# Patient Record
Sex: FEMALE | Race: WHITE | Employment: FULL TIME | URBAN - METROPOLITAN AREA
[De-identification: names, ages, dates, MRNs, and addresses within clinical notes are randomized per-mention and may not be internally consistent; named-entity substitution may affect disease eponyms.]

---

## 2022-11-07 ENCOUNTER — HOSPITAL ENCOUNTER (OUTPATIENT)
Dept: PREADMISSION TESTING | Age: 42
Discharge: HOME | End: 2022-11-07
Payer: COMMERCIAL

## 2022-11-07 VITALS
HEART RATE: 80 BPM | SYSTOLIC BLOOD PRESSURE: 128 MMHG | HEIGHT: 66 IN | WEIGHT: 293 LBS | RESPIRATION RATE: 16 BRPM | BODY MASS INDEX: 47.09 KG/M2 | DIASTOLIC BLOOD PRESSURE: 82 MMHG | TEMPERATURE: 97.1 F | OXYGEN SATURATION: 97 %

## 2022-11-07 LAB
ALBUMIN SERPL-MCNC: 3.2 G/DL (ref 3.5–5)
ALBUMIN/GLOB SERPL: 0.8 {RATIO} (ref 1.1–2.2)
ALP SERPL-CCNC: 70 U/L (ref 45–117)
ALT SERPL-CCNC: 56 U/L (ref 12–78)
ANION GAP SERPL CALC-SCNC: 7 MMOL/L (ref 5–15)
AST SERPL-CCNC: 25 U/L (ref 15–37)
ATRIAL RATE: 78 BPM
BASOPHILS # BLD: 0.1 K/UL (ref 0–0.1)
BASOPHILS NFR BLD: 1 % (ref 0–1)
BILIRUB SERPL-MCNC: 0.7 MG/DL (ref 0.2–1)
BUN SERPL-MCNC: 10 MG/DL (ref 6–20)
BUN/CREAT SERPL: 13 (ref 12–20)
CALCIUM SERPL-MCNC: 9 MG/DL (ref 8.5–10.1)
CALCULATED P AXIS, ECG09: 87 DEGREES
CALCULATED R AXIS, ECG10: -25 DEGREES
CALCULATED T AXIS, ECG11: 14 DEGREES
CHLORIDE SERPL-SCNC: 104 MMOL/L (ref 97–108)
CO2 SERPL-SCNC: 29 MMOL/L (ref 21–32)
CREAT SERPL-MCNC: 0.75 MG/DL (ref 0.55–1.02)
DIAGNOSIS, 93000: NORMAL
DIFFERENTIAL METHOD BLD: ABNORMAL
EOSINOPHIL # BLD: 0.4 K/UL (ref 0–0.4)
EOSINOPHIL NFR BLD: 5 % (ref 0–7)
ERYTHROCYTE [DISTWIDTH] IN BLOOD BY AUTOMATED COUNT: 14.5 % (ref 11.5–14.5)
GLOBULIN SER CALC-MCNC: 3.9 G/DL (ref 2–4)
GLUCOSE SERPL-MCNC: 136 MG/DL (ref 65–100)
HCG UR QL: NEGATIVE
HCT VFR BLD AUTO: 42 % (ref 35–47)
HGB BLD-MCNC: 12.9 G/DL (ref 11.5–16)
IMM GRANULOCYTES # BLD AUTO: 0 K/UL (ref 0–0.04)
IMM GRANULOCYTES NFR BLD AUTO: 0 % (ref 0–0.5)
LYMPHOCYTES # BLD: 2.1 K/UL (ref 0.8–3.5)
LYMPHOCYTES NFR BLD: 27 % (ref 12–49)
MCH RBC QN AUTO: 25.4 PG (ref 26–34)
MCHC RBC AUTO-ENTMCNC: 30.7 G/DL (ref 30–36.5)
MCV RBC AUTO: 82.8 FL (ref 80–99)
MONOCYTES # BLD: 0.6 K/UL (ref 0–1)
MONOCYTES NFR BLD: 7 % (ref 5–13)
NEUTS SEG # BLD: 4.7 K/UL (ref 1.8–8)
NEUTS SEG NFR BLD: 60 % (ref 32–75)
NRBC # BLD: 0 K/UL (ref 0–0.01)
NRBC BLD-RTO: 0 PER 100 WBC
P-R INTERVAL, ECG05: 198 MS
PLATELET # BLD AUTO: 292 K/UL (ref 150–400)
PMV BLD AUTO: 9.4 FL (ref 8.9–12.9)
POTASSIUM SERPL-SCNC: 4 MMOL/L (ref 3.5–5.1)
PROT SERPL-MCNC: 7.1 G/DL (ref 6.4–8.2)
Q-T INTERVAL, ECG07: 378 MS
QRS DURATION, ECG06: 114 MS
QTC CALCULATION (BEZET), ECG08: 430 MS
RBC # BLD AUTO: 5.07 M/UL (ref 3.8–5.2)
SODIUM SERPL-SCNC: 140 MMOL/L (ref 136–145)
VENTRICULAR RATE, ECG03: 78 BPM
WBC # BLD AUTO: 7.9 K/UL (ref 3.6–11)

## 2022-11-07 PROCEDURE — 86905 BLOOD TYPING RBC ANTIGENS: CPT

## 2022-11-07 PROCEDURE — 80053 COMPREHEN METABOLIC PANEL: CPT

## 2022-11-07 PROCEDURE — 86922 COMPATIBILITY TEST ANTIGLOB: CPT

## 2022-11-07 PROCEDURE — 86880 COOMBS TEST DIRECT: CPT

## 2022-11-07 PROCEDURE — 86921 COMPATIBILITY TEST INCUBATE: CPT

## 2022-11-07 PROCEDURE — 85025 COMPLETE CBC W/AUTO DIFF WBC: CPT

## 2022-11-07 PROCEDURE — 86900 BLOOD TYPING SEROLOGIC ABO: CPT

## 2022-11-07 PROCEDURE — 93005 ELECTROCARDIOGRAM TRACING: CPT

## 2022-11-07 PROCEDURE — 86920 COMPATIBILITY TEST SPIN: CPT

## 2022-11-07 PROCEDURE — 86870 RBC ANTIBODY IDENTIFICATION: CPT

## 2022-11-07 PROCEDURE — 86902 BLOOD TYPE ANTIGEN DONOR EA: CPT

## 2022-11-07 PROCEDURE — 36415 COLL VENOUS BLD VENIPUNCTURE: CPT

## 2022-11-07 PROCEDURE — 81025 URINE PREGNANCY TEST: CPT

## 2022-11-07 RX ORDER — LISINOPRIL AND HYDROCHLOROTHIAZIDE 12.5; 2 MG/1; MG/1
1 TABLET ORAL DAILY
COMMUNITY

## 2022-11-07 RX ORDER — MULTIVITAMIN WITH IRON
1 TABLET ORAL DAILY
COMMUNITY

## 2022-11-07 RX ORDER — ESCITALOPRAM OXALATE 10 MG/1
10 TABLET ORAL DAILY
COMMUNITY

## 2022-11-07 RX ORDER — ACETAMINOPHEN AND CODEINE PHOSPHATE 120; 12 MG/5ML; MG/5ML
1 SOLUTION ORAL DAILY
COMMUNITY
End: 2022-11-23

## 2022-11-07 NOTE — PERIOP NOTES
[address], [address] [facility]   MAIN OR                                  [phone]   MAIN PRE OP                          [phone]                                                                                AMBULATORY PRE OP          [phone]  PRE-ADMISSION TESTING    [phone]     Surgery Date:  11/22/2022 Tuesday       Is surgery arrival time given by surgeon? NO  If NO, [facility] staff will call you between 3 and 7pm the day before your surgery with your arrival time. (If your surgery is on a Monday, we will call you the Friday before.)    Call [phone] after 7pm Monday-Friday if you did not receive this call. INSTRUCTIONS BEFORE YOUR SURGERY   When You  Arrive Arrive at the [facility]nd [address] [facility] on the day of your surgery  Have your insurance card, photo ID, and any copayment (if needed)   Food   and   Drink NO food or drink after midnight the night before surgery    This means NO water, gum, mints, coffee, juice, etc.  No alcohol (beer, wine, liquor) 24 hours before and after surgery   Medications to   TAKE   Morning of Surgery MEDICATIONS TO TAKE THE MORNING OF SURGERY WITH A SIP OF WATER:   Escitalopram  Cetirizine    Continue all other prescriptions as normal just do not take them the morning of surgery. Medications  To  STOP      7 days before surgery Non-Steroidal anti-inflammatory Drugs (NSAID's): for example, Ibuprofen (Advil, Motrin), Naproxen (Aleve)  Aspirin, if taking for pain   Herbal supplements, vitamins, and fish oil  Other:Vitamin C and D, Turmeric, Multi  (Pain medications not listed above, including Tylenol may be taken)   Blood  Thinners If you take  Aspirin, Plavix, Coumadin, or any blood-thinning or anti-blood clot medicine, talk to the doctor who prescribed the medications for pre-operative instructions.    Bathing Clothing  Jewelry  Valuables     If you shower the morning of surgery, please do not apply anything to your skin (lotions, powders, deodorant, or makeup, especially mascara)  Follow Chlorhexidine Care Fusion body wash instructions provided to you during PAT appointment. Begin 3 days prior to surgery. Do not shave or trim anywhere 24 hours before surgery  Wear your hair loose or down; no pony-tails, buns, or metal hair clips  Wear loose, comfortable, clean clothes  Wear glasses instead of contacts  Leave money, valuables, and jewelry, including body piercings, at home  If you were given an Sepaton, bring it on day of surgery. Going Home - or Spending the Night SAME-DAY SURGERY: You must have a responsible adult drive you home and stay with you 24 hours after surgery  ADMITS: If your doctor is keeping you in the hospital after surgery, leave personal belongings/luggage in your car until you have a hospital room number. Hospital discharge time is 12 noon  Drivers must be here before 12 noon unless you are told differently   Special Instructions        Follow all instructions so your surgery wont be cancelled. Please, be on time. If a situation occurs and you are delayed the day of surgery, call [phone]. If your physical condition changes (like a fever, cold, flu, etc.) call your surgeon. Home medication(s) reviewed and verified verbally with list during PAT appointment. The patient was contacted in person. The patient verbalizes understanding of all instructions and does not need reinforcement.

## 2022-11-21 ENCOUNTER — ANESTHESIA EVENT (OUTPATIENT)
Dept: SURGERY | Age: 42
End: 2022-11-21
Payer: COMMERCIAL

## 2022-11-21 NOTE — H&P
History and Physical    Christen Reyna 12QP F    1980    #[id number]      Encounter Summary - H&P  Date Printed:   2022            Encounter Date 2022       Chief Complaint endometrial biopsy*, Pre-Op Exam       History of Present Illness Rm 7: 35yo  patient presents for pre-op and EMBx. Last saw [facility] 10/20/2022. Consent form given    Procedure: robotic assisted TLH/BS, removal of pelvic mass, possible oophorectomy scheduled for 22 with [facility] and Rosana Jean to assist.       Past Medical History Reviewed Past Medical History  Hypertension (high blood pressure): Y  Other: (no answer) - PTSD       Social History Reviewed Social History         Surgical History Reviewed Surgical History     delivery - 2014       Family History Reviewed Family History    Mother - Alzheimer's disease     - Hysterectomy  - at age 25; unsure why it was done   Father - Heart disease     - Malignant melanoma   Step father passed away in 2022. Medications Reviewed Medications     escitalopram 10 mg tablet  TAKE 1 TABLET BY MOUTH EVERY DAY 22   filled    lisinopriL 20 mg-hydrochlorothiazide 12.5 mg tablet  TAKE 1 TABLET BY MOUTH EVERY DAY 10/07/22   filled    multivitamin 22   entered    norethindrone (contraceptive) 0.35 mg tablet  TAKE 1 TABLET BY MOUTH EVERY DAY 10/18/22   filled    potassium 22   entered    Vitamin C 22   entered    Vitamin D 22   entered              Allergies Reviewed Allergies     PORK/PORCINE CONTAINING PRODUCTS    NKDA             Vitals Ht: 5 ft 6 in (167.64 cm) 2022 12:50 pm Wt: 355 lbs (161.03 kg) 2022 12:50 pm BMI: 57.3 2022 12:50 pm   BP: 126/86 2022 12:55 pm                 Review of Systems Additionally reports: Except as noted in the HPI, the review of systems is negative for General, Breast, , Resp, GI, CV, Endo, MS, Psych and Heme.        Physical Exam Chaperone: Chaperone: present. Constitutional: General Appearance: pleasant and morbidly obese. Level of Distress: no acute distress. Ambulation: ambulating normally. Head: Head: normocephalic. Eyes: Extraocular Movements extraocular movements intact. Ears, Nose, Mouth, Throat: Ears normal hearing. Lungs / Chest: Respiratory effort: unlabored. CTAB    Cardiovascular: Rate And Rhythm: regular. Abdomen: Inspection and Palpation: soft, non-distended, and no guarding. Female : External genitalia: no lesions, rash, erythema, or genital warts. Vagina: no discharge, mass, or tenderness and moist mucosa and non-erythematous mucosa. Cervix: no discharge, inflammation, or cervical lesion. Uterus: midline, smooth, and no mass; no descent. Adnexae: no adnexal mass or tenderness. Extremities: Extremities: no swelling or inflammation of extremities. Musculoskeletal System: General Musculoskeletal no joint, muscle, or bone tenderness. Skin: General Skin no rash or suspicious lesions. Neurologic: Gait and Station: normal gait. Sensation: grossly intact. Motor: grossly intact. Mental Status Exam: Orientation oriented to person, place, and time. Assessment and Plan 1. Cyst of ovary -  2022 with 8cm pelvic mass, simple iwth one septation. 2022 8cm LOV simple cyst, no complexity   normal  No pain    Will proceed with laparoscopic removal of pelvic mass, suspected to be ovary, Karla Espinoza assisted  N83.209: Unspecified ovarian cyst, unspecified side    2. Abnormal uterine bleeding -  41yo with morbid obesity and HTN with menorrhagia, desiring management. Heavy periods, dysmenorrhea x 3 years  10 days    Hgb 13  TSH normal  FSH 5.8  Pap NILM 2021    Curretnly on norethindrone and unhappy w/ weight gain.   estrogen CI    [place]- 2 years ago embx normal.    PSHx-  section  PMHx: HTN  Exam: Obese, no descent of the uterus however palpates mobile    Embx- benign    On norethindrone- declines IUD  POPs- weight gain    -->laparoscopic removal of pelvic mass, possible oophorectomy  Possible hysteroscopy, D&C, mirena placement VERSUS hysterectomy    Reviewed risks/benefits of hysterectomy. Reviewed the challenges with her weight and potential conversion to abdominal hysterectomy requiring hospital admission and increased risk of postoperative complications. I reviewed risks of bleeding infection, injury to surrounding tissue. Recovery and pelvic rest 6-8 weeks. Potential same day discharge. Surgery: TLH/BS Davinci assisted, removal of pelvic mass possible oophorectomy    Reviewed same day discharge vs 23h observation  Reviewed potential for conversion to the abdominal hysterectomy.   606/[id number] Jennifer Chan consents signed today    N93.9: Abnormal uterine and vaginal bleeding, unspecified  PREGNANCY TEST, URINE -      Specimen source: Urine  ANATOMIC PATHOLOGY REPORT -      Specimen source: Endometrium  Source: endometrial biopsy     PREGNANCY TEST, URINE  ·  Result:    - HCG: negative         Provider   Electronically Signed by: Madison Becerra MD

## 2022-11-22 ENCOUNTER — HOSPITAL ENCOUNTER (OUTPATIENT)
Age: 42
Setting detail: OBSERVATION
Discharge: HOME | End: 2022-11-23
Attending: OBSTETRICS & GYNECOLOGY | Admitting: OBSTETRICS & GYNECOLOGY
Payer: COMMERCIAL

## 2022-11-22 ENCOUNTER — ANESTHESIA (OUTPATIENT)
Dept: SURGERY | Age: 42
End: 2022-11-22
Payer: COMMERCIAL

## 2022-11-22 DIAGNOSIS — Z90.710 S/P LAPAROSCOPIC HYSTERECTOMY: Primary | ICD-10-CM

## 2022-11-22 LAB
ABO + RH BLD: NORMAL
ANTI-COMPLEMENT (C3B,C3D): NORMAL
ANTIGENS PRESENT BLD: NORMAL
ANTIGENS PRESENT RBC DONR: NORMAL
ANTIGENS PRESENT RBC DONR: NORMAL
BLD PROD TYP BPU: NORMAL
BLD PROD TYP BPU: NORMAL
BLOOD GROUP ANTIBODIES SERPL: NORMAL
BLOOD GROUP ANTIBODIES SERPL: NORMAL
BPU ID: NORMAL
BPU ID: NORMAL
CROSSMATCH RESULT,%XM: NORMAL
CROSSMATCH RESULT,%XM: NORMAL
DAT IGG-SP REAG RBC QL: NORMAL
DAT POLY-SP REAG RBC QL: NORMAL
HCG UR QL: NEGATIVE
SPECIMEN EXP DATE BLD: NORMAL
STATUS OF UNIT,%ST: NORMAL
STATUS OF UNIT,%ST: NORMAL
UNIT DIVISION, %UDIV: 0
UNIT DIVISION, %UDIV: 0

## 2022-11-22 PROCEDURE — 77030040361 HC SLV COMPR DVT MDII -B

## 2022-11-22 PROCEDURE — 76060000038 HC ANESTHESIA 3.5 TO 4 HR: Performed by: OBSTETRICS & GYNECOLOGY

## 2022-11-22 PROCEDURE — 77030008756 HC TU IRR SUC STRY -B: Performed by: OBSTETRICS & GYNECOLOGY

## 2022-11-22 PROCEDURE — 74011000250 HC RX REV CODE- 250: Performed by: NURSE ANESTHETIST, CERTIFIED REGISTERED

## 2022-11-22 PROCEDURE — 77030008684 HC TU ET CUF COVD -B: Performed by: ANESTHESIOLOGY

## 2022-11-22 PROCEDURE — 77030035277 HC OBTRTR BLDELSS DISP INTU -B: Performed by: OBSTETRICS & GYNECOLOGY

## 2022-11-22 PROCEDURE — 77030016154: Performed by: OBSTETRICS & GYNECOLOGY

## 2022-11-22 PROCEDURE — 88307 TISSUE EXAM BY PATHOLOGIST: CPT

## 2022-11-22 PROCEDURE — 74011250636 HC RX REV CODE- 250/636: Performed by: NURSE ANESTHETIST, CERTIFIED REGISTERED

## 2022-11-22 PROCEDURE — 76010000879 HC OR TIME 3.5 TO 4HR INTENSV - TIER 2: Performed by: OBSTETRICS & GYNECOLOGY

## 2022-11-22 PROCEDURE — G0378 HOSPITAL OBSERVATION PER HR: HCPCS

## 2022-11-22 PROCEDURE — 77030008771 HC TU NG SALEM SUMP -A: Performed by: ANESTHESIOLOGY

## 2022-11-22 PROCEDURE — 86920 COMPATIBILITY TEST SPIN: CPT

## 2022-11-22 PROCEDURE — 86880 COOMBS TEST DIRECT: CPT

## 2022-11-22 PROCEDURE — 77030040922 HC BLNKT HYPOTHRM STRY -A

## 2022-11-22 PROCEDURE — 77030018778 HC MANIP UTER VCAR CNMD -B: Performed by: OBSTETRICS & GYNECOLOGY

## 2022-11-22 PROCEDURE — 86921 COMPATIBILITY TEST INCUBATE: CPT

## 2022-11-22 PROCEDURE — 77030041236 HC APPL SURG ENDO JNJ -B: Performed by: OBSTETRICS & GYNECOLOGY

## 2022-11-22 PROCEDURE — 86905 BLOOD TYPING RBC ANTIGENS: CPT

## 2022-11-22 PROCEDURE — 77030031139 HC SUT VCRL2 J&J -A: Performed by: OBSTETRICS & GYNECOLOGY

## 2022-11-22 PROCEDURE — 86922 COMPATIBILITY TEST ANTIGLOB: CPT

## 2022-11-22 PROCEDURE — 2709999900 HC NON-CHARGEABLE SUPPLY: Performed by: OBSTETRICS & GYNECOLOGY

## 2022-11-22 PROCEDURE — 77030037834: Performed by: OBSTETRICS & GYNECOLOGY

## 2022-11-22 PROCEDURE — 77030022704 HC SUT VLOC COVD -B: Performed by: OBSTETRICS & GYNECOLOGY

## 2022-11-22 PROCEDURE — 77030026438 HC STYL ET INTUB CARD -A: Performed by: ANESTHESIOLOGY

## 2022-11-22 PROCEDURE — 36415 COLL VENOUS BLD VENIPUNCTURE: CPT

## 2022-11-22 PROCEDURE — 77030019927 HC TBNG IRR CYSTO BAXT -A: Performed by: OBSTETRICS & GYNECOLOGY

## 2022-11-22 PROCEDURE — 86870 RBC ANTIBODY IDENTIFICATION: CPT

## 2022-11-22 PROCEDURE — 77030035029 HC NDL INSUF VERES DISP COVD -B: Performed by: OBSTETRICS & GYNECOLOGY

## 2022-11-22 PROCEDURE — 74011000250 HC RX REV CODE- 250: Performed by: OBSTETRICS & GYNECOLOGY

## 2022-11-22 PROCEDURE — 86900 BLOOD TYPING SEROLOGIC ABO: CPT

## 2022-11-22 PROCEDURE — 74011250636 HC RX REV CODE- 250/636: Performed by: OBSTETRICS & GYNECOLOGY

## 2022-11-22 PROCEDURE — 74011250636 HC RX REV CODE- 250/636: Performed by: ANESTHESIOLOGY

## 2022-11-22 PROCEDURE — 77030033162 HC PCH SPEC RTVR ENDOSC AMR -B: Performed by: OBSTETRICS & GYNECOLOGY

## 2022-11-22 PROCEDURE — 77030039147 HC PWDR HEMSTS SURGICEL JNJ -D: Performed by: OBSTETRICS & GYNECOLOGY

## 2022-11-22 PROCEDURE — 77030002933 HC SUT MCRYL J&J -A: Performed by: OBSTETRICS & GYNECOLOGY

## 2022-11-22 PROCEDURE — 77030020703 HC SEAL CANN DISP INTU -B: Performed by: OBSTETRICS & GYNECOLOGY

## 2022-11-22 PROCEDURE — 77030031492 HC PRT ACC BLNT AIRSEAL CNMD -B: Performed by: OBSTETRICS & GYNECOLOGY

## 2022-11-22 PROCEDURE — 81025 URINE PREGNANCY TEST: CPT

## 2022-11-22 PROCEDURE — 77030013079 HC BLNKT BAIR HGGR 3M -A: Performed by: ANESTHESIOLOGY

## 2022-11-22 PROCEDURE — 77030037032 HC INSRT SCIS CLICKLLINE DISP STOR -B: Performed by: OBSTETRICS & GYNECOLOGY

## 2022-11-22 PROCEDURE — 74011250637 HC RX REV CODE- 250/637: Performed by: OBSTETRICS & GYNECOLOGY

## 2022-11-22 PROCEDURE — 76210000016 HC OR PH I REC 1 TO 1.5 HR: Performed by: OBSTETRICS & GYNECOLOGY

## 2022-11-22 RX ORDER — BUPIVACAINE HYDROCHLORIDE AND EPINEPHRINE 5; 5 MG/ML; UG/ML
INJECTION, SOLUTION EPIDURAL; INTRACAUDAL; PERINEURAL AS NEEDED
Status: DISCONTINUED | OUTPATIENT
Start: 2022-11-22 | End: 2022-11-22 | Stop reason: HOSPADM

## 2022-11-22 RX ORDER — ONDANSETRON 2 MG/ML
INJECTION INTRAMUSCULAR; INTRAVENOUS AS NEEDED
Status: DISCONTINUED | OUTPATIENT
Start: 2022-11-22 | End: 2022-11-22 | Stop reason: HOSPADM

## 2022-11-22 RX ORDER — NALOXONE HYDROCHLORIDE 0.4 MG/ML
0.2 INJECTION, SOLUTION INTRAMUSCULAR; INTRAVENOUS; SUBCUTANEOUS
Status: DISCONTINUED | OUTPATIENT
Start: 2022-11-22 | End: 2022-11-22 | Stop reason: HOSPADM

## 2022-11-22 RX ORDER — HYDROMORPHONE HYDROCHLORIDE 1 MG/ML
.5-1 INJECTION, SOLUTION INTRAMUSCULAR; INTRAVENOUS; SUBCUTANEOUS
Status: DISCONTINUED | OUTPATIENT
Start: 2022-11-22 | End: 2022-11-22 | Stop reason: HOSPADM

## 2022-11-22 RX ORDER — METRONIDAZOLE 500 MG/100ML
500 INJECTION, SOLUTION INTRAVENOUS ONCE
Status: COMPLETED | OUTPATIENT
Start: 2022-11-22 | End: 2022-11-22

## 2022-11-22 RX ORDER — DEXAMETHASONE SODIUM PHOSPHATE 4 MG/ML
INJECTION, SOLUTION INTRA-ARTICULAR; INTRALESIONAL; INTRAMUSCULAR; INTRAVENOUS; SOFT TISSUE AS NEEDED
Status: DISCONTINUED | OUTPATIENT
Start: 2022-11-22 | End: 2022-11-22 | Stop reason: HOSPADM

## 2022-11-22 RX ORDER — SODIUM CHLORIDE 0.9 % (FLUSH) 0.9 %
5-40 SYRINGE (ML) INJECTION EVERY 8 HOURS
Status: DISCONTINUED | OUTPATIENT
Start: 2022-11-22 | End: 2022-11-22 | Stop reason: HOSPADM

## 2022-11-22 RX ORDER — OXYCODONE HYDROCHLORIDE 5 MG/1
5 TABLET ORAL
Status: DISCONTINUED | OUTPATIENT
Start: 2022-11-22 | End: 2022-11-23 | Stop reason: HOSPADM

## 2022-11-22 RX ORDER — OXYCODONE HYDROCHLORIDE 5 MG/1
10 TABLET ORAL
Status: DISCONTINUED | OUTPATIENT
Start: 2022-11-22 | End: 2022-11-23 | Stop reason: HOSPADM

## 2022-11-22 RX ORDER — EPHEDRINE SULFATE/0.9% NACL/PF 50 MG/5 ML
SYRINGE (ML) INTRAVENOUS AS NEEDED
Status: DISCONTINUED | OUTPATIENT
Start: 2022-11-22 | End: 2022-11-22 | Stop reason: HOSPADM

## 2022-11-22 RX ORDER — FENTANYL CITRATE 50 UG/ML
INJECTION, SOLUTION INTRAMUSCULAR; INTRAVENOUS AS NEEDED
Status: DISCONTINUED | OUTPATIENT
Start: 2022-11-22 | End: 2022-11-22 | Stop reason: HOSPADM

## 2022-11-22 RX ORDER — SODIUM CHLORIDE 0.9 % (FLUSH) 0.9 %
5-40 SYRINGE (ML) INJECTION AS NEEDED
Status: DISCONTINUED | OUTPATIENT
Start: 2022-11-22 | End: 2022-11-22 | Stop reason: HOSPADM

## 2022-11-22 RX ORDER — SODIUM CHLORIDE 0.9 % (FLUSH) 0.9 %
5-40 SYRINGE (ML) INJECTION EVERY 8 HOURS
Status: DISCONTINUED | OUTPATIENT
Start: 2022-11-22 | End: 2022-11-23 | Stop reason: HOSPADM

## 2022-11-22 RX ORDER — PHENYLEPHRINE HCL IN 0.9% NACL 0.4MG/10ML
SYRINGE (ML) INTRAVENOUS AS NEEDED
Status: DISCONTINUED | OUTPATIENT
Start: 2022-11-22 | End: 2022-11-22 | Stop reason: HOSPADM

## 2022-11-22 RX ORDER — KETAMINE HYDROCHLORIDE 10 MG/ML
INJECTION, SOLUTION INTRAMUSCULAR; INTRAVENOUS AS NEEDED
Status: DISCONTINUED | OUTPATIENT
Start: 2022-11-22 | End: 2022-11-22 | Stop reason: HOSPADM

## 2022-11-22 RX ORDER — ROCURONIUM BROMIDE 10 MG/ML
INJECTION, SOLUTION INTRAVENOUS AS NEEDED
Status: DISCONTINUED | OUTPATIENT
Start: 2022-11-22 | End: 2022-11-22 | Stop reason: HOSPADM

## 2022-11-22 RX ORDER — KETOROLAC TROMETHAMINE 30 MG/ML
30 INJECTION, SOLUTION INTRAMUSCULAR; INTRAVENOUS EVERY 6 HOURS
Status: DISCONTINUED | OUTPATIENT
Start: 2022-11-22 | End: 2022-11-23 | Stop reason: HOSPADM

## 2022-11-22 RX ORDER — MIDAZOLAM HYDROCHLORIDE 1 MG/ML
INJECTION, SOLUTION INTRAMUSCULAR; INTRAVENOUS AS NEEDED
Status: DISCONTINUED | OUTPATIENT
Start: 2022-11-22 | End: 2022-11-22 | Stop reason: HOSPADM

## 2022-11-22 RX ORDER — ACETAMINOPHEN 325 MG/1
975 TABLET ORAL EVERY 6 HOURS
Status: DISCONTINUED | OUTPATIENT
Start: 2022-11-22 | End: 2022-11-23 | Stop reason: HOSPADM

## 2022-11-22 RX ORDER — SODIUM CHLORIDE 9 MG/ML
75 INJECTION, SOLUTION INTRAVENOUS CONTINUOUS
Status: CANCELLED | OUTPATIENT
Start: 2022-11-22

## 2022-11-22 RX ORDER — LIDOCAINE HYDROCHLORIDE 20 MG/ML
INJECTION, SOLUTION EPIDURAL; INFILTRATION; INTRACAUDAL; PERINEURAL AS NEEDED
Status: DISCONTINUED | OUTPATIENT
Start: 2022-11-22 | End: 2022-11-22 | Stop reason: HOSPADM

## 2022-11-22 RX ORDER — HYDROMORPHONE HYDROCHLORIDE 1 MG/ML
1 INJECTION, SOLUTION INTRAMUSCULAR; INTRAVENOUS; SUBCUTANEOUS
Status: DISCONTINUED | OUTPATIENT
Start: 2022-11-22 | End: 2022-11-23 | Stop reason: HOSPADM

## 2022-11-22 RX ORDER — SODIUM CHLORIDE, SODIUM LACTATE, POTASSIUM CHLORIDE, CALCIUM CHLORIDE 600; 310; 30; 20 MG/100ML; MG/100ML; MG/100ML; MG/100ML
INJECTION, SOLUTION INTRAVENOUS
Status: DISCONTINUED | OUTPATIENT
Start: 2022-11-22 | End: 2022-11-22 | Stop reason: HOSPADM

## 2022-11-22 RX ORDER — LIDOCAINE HYDROCHLORIDE 10 MG/ML
0.1 INJECTION, SOLUTION EPIDURAL; INFILTRATION; INTRACAUDAL; PERINEURAL AS NEEDED
Status: DISCONTINUED | OUTPATIENT
Start: 2022-11-22 | End: 2022-11-22 | Stop reason: HOSPADM

## 2022-11-22 RX ORDER — OXYCODONE HYDROCHLORIDE 5 MG/1
5 TABLET ORAL
Qty: 12 TABLET | Refills: 0 | Status: SHIPPED | OUTPATIENT
Start: 2022-11-22 | End: 2022-11-25

## 2022-11-22 RX ORDER — PROPOFOL 10 MG/ML
INJECTION, EMULSION INTRAVENOUS AS NEEDED
Status: DISCONTINUED | OUTPATIENT
Start: 2022-11-22 | End: 2022-11-22 | Stop reason: HOSPADM

## 2022-11-22 RX ORDER — NEOSTIGMINE METHYLSULFATE 1 MG/ML
INJECTION, SOLUTION INTRAVENOUS AS NEEDED
Status: DISCONTINUED | OUTPATIENT
Start: 2022-11-22 | End: 2022-11-22 | Stop reason: HOSPADM

## 2022-11-22 RX ORDER — BISACODYL 5 MG
5 TABLET, DELAYED RELEASE (ENTERIC COATED) ORAL DAILY PRN
Status: DISCONTINUED | OUTPATIENT
Start: 2022-11-22 | End: 2022-11-23 | Stop reason: HOSPADM

## 2022-11-22 RX ORDER — FLUMAZENIL 0.1 MG/ML
0.2 INJECTION INTRAVENOUS
Status: DISCONTINUED | OUTPATIENT
Start: 2022-11-22 | End: 2022-11-22 | Stop reason: HOSPADM

## 2022-11-22 RX ORDER — SODIUM CHLORIDE, SODIUM LACTATE, POTASSIUM CHLORIDE, CALCIUM CHLORIDE 600; 310; 30; 20 MG/100ML; MG/100ML; MG/100ML; MG/100ML
125 INJECTION, SOLUTION INTRAVENOUS CONTINUOUS
Status: DISCONTINUED | OUTPATIENT
Start: 2022-11-22 | End: 2022-11-22 | Stop reason: HOSPADM

## 2022-11-22 RX ORDER — SODIUM CHLORIDE 9 MG/ML
125 INJECTION, SOLUTION INTRAVENOUS CONTINUOUS
Status: DISCONTINUED | OUTPATIENT
Start: 2022-11-22 | End: 2022-11-23 | Stop reason: HOSPADM

## 2022-11-22 RX ORDER — ESCITALOPRAM OXALATE 10 MG/1
10 TABLET ORAL DAILY
Status: DISCONTINUED | OUTPATIENT
Start: 2022-11-23 | End: 2022-11-23 | Stop reason: HOSPADM

## 2022-11-22 RX ORDER — DIPHENHYDRAMINE HCL 25 MG
25 CAPSULE ORAL
Status: DISCONTINUED | OUTPATIENT
Start: 2022-11-22 | End: 2022-11-23 | Stop reason: HOSPADM

## 2022-11-22 RX ORDER — IBUPROFEN 800 MG/1
800 TABLET ORAL
Qty: 40 TABLET | Refills: 1 | Status: SHIPPED | OUTPATIENT
Start: 2022-11-22

## 2022-11-22 RX ORDER — ENOXAPARIN SODIUM 100 MG/ML
40 INJECTION SUBCUTANEOUS EVERY 24 HOURS
Status: DISCONTINUED | OUTPATIENT
Start: 2022-11-23 | End: 2022-11-22

## 2022-11-22 RX ORDER — ONDANSETRON 2 MG/ML
4 INJECTION INTRAMUSCULAR; INTRAVENOUS AS NEEDED
Status: DISCONTINUED | OUTPATIENT
Start: 2022-11-22 | End: 2022-11-22 | Stop reason: HOSPADM

## 2022-11-22 RX ORDER — SUCCINYLCHOLINE CHLORIDE 20 MG/ML
INJECTION INTRAMUSCULAR; INTRAVENOUS AS NEEDED
Status: DISCONTINUED | OUTPATIENT
Start: 2022-11-22 | End: 2022-11-22 | Stop reason: HOSPADM

## 2022-11-22 RX ORDER — FAMOTIDINE 10 MG/ML
INJECTION INTRAVENOUS AS NEEDED
Status: DISCONTINUED | OUTPATIENT
Start: 2022-11-22 | End: 2022-11-22 | Stop reason: HOSPADM

## 2022-11-22 RX ORDER — ENOXAPARIN SODIUM 100 MG/ML
40 INJECTION SUBCUTANEOUS EVERY 12 HOURS
Status: DISCONTINUED | OUTPATIENT
Start: 2022-11-23 | End: 2022-11-23 | Stop reason: HOSPADM

## 2022-11-22 RX ORDER — SODIUM CHLORIDE, SODIUM LACTATE, POTASSIUM CHLORIDE, CALCIUM CHLORIDE 600; 310; 30; 20 MG/100ML; MG/100ML; MG/100ML; MG/100ML
100 INJECTION, SOLUTION INTRAVENOUS CONTINUOUS
Status: DISCONTINUED | OUTPATIENT
Start: 2022-11-22 | End: 2022-11-22 | Stop reason: HOSPADM

## 2022-11-22 RX ORDER — SODIUM CHLORIDE 0.9 % (FLUSH) 0.9 %
5-40 SYRINGE (ML) INJECTION AS NEEDED
Status: DISCONTINUED | OUTPATIENT
Start: 2022-11-22 | End: 2022-11-23 | Stop reason: HOSPADM

## 2022-11-22 RX ORDER — ONDANSETRON 4 MG/1
4 TABLET, ORALLY DISINTEGRATING ORAL
Status: DISCONTINUED | OUTPATIENT
Start: 2022-11-22 | End: 2022-11-23 | Stop reason: HOSPADM

## 2022-11-22 RX ORDER — GLYCOPYRROLATE 0.2 MG/ML
INJECTION INTRAMUSCULAR; INTRAVENOUS AS NEEDED
Status: DISCONTINUED | OUTPATIENT
Start: 2022-11-22 | End: 2022-11-22 | Stop reason: HOSPADM

## 2022-11-22 RX ADMIN — LIDOCAINE HYDROCHLORIDE 50 MG: 20 INJECTION, SOLUTION EPIDURAL; INFILTRATION; INTRACAUDAL; PERINEURAL at 07:41

## 2022-11-22 RX ADMIN — DEXAMETHASONE SODIUM PHOSPHATE 8 MG: 4 INJECTION, SOLUTION INTRAMUSCULAR; INTRAVENOUS at 08:02

## 2022-11-22 RX ADMIN — Medication 10 ML: at 18:22

## 2022-11-22 RX ADMIN — FENTANYL CITRATE 50 MCG: 50 INJECTION, SOLUTION INTRAMUSCULAR; INTRAVENOUS at 08:36

## 2022-11-22 RX ADMIN — SODIUM CHLORIDE, POTASSIUM CHLORIDE, SODIUM LACTATE AND CALCIUM CHLORIDE: 600; 310; 30; 20 INJECTION, SOLUTION INTRAVENOUS at 07:30

## 2022-11-22 RX ADMIN — Medication 80 MCG: at 08:23

## 2022-11-22 RX ADMIN — SODIUM CHLORIDE 125 ML/HR: 9 INJECTION, SOLUTION INTRAVENOUS at 21:02

## 2022-11-22 RX ADMIN — Medication 10 ML: at 22:00

## 2022-11-22 RX ADMIN — ACETAMINOPHEN 975 MG: 325 TABLET ORAL at 18:22

## 2022-11-22 RX ADMIN — ONDANSETRON HYDROCHLORIDE 4 MG: 2 SOLUTION INTRAMUSCULAR; INTRAVENOUS at 07:30

## 2022-11-22 RX ADMIN — ROCURONIUM BROMIDE 30 MG: 10 INJECTION INTRAVENOUS at 07:46

## 2022-11-22 RX ADMIN — KETOROLAC TROMETHAMINE 30 MG: 30 INJECTION, SOLUTION INTRAMUSCULAR; INTRAVENOUS at 18:22

## 2022-11-22 RX ADMIN — Medication 80 MCG: at 10:16

## 2022-11-22 RX ADMIN — GLYCOPYRROLATE 0.6 MG: 0.2 INJECTION INTRAMUSCULAR; INTRAVENOUS at 10:42

## 2022-11-22 RX ADMIN — Medication 10 MG: at 08:17

## 2022-11-22 RX ADMIN — SODIUM CHLORIDE 125 ML/HR: 9 INJECTION, SOLUTION INTRAVENOUS at 12:56

## 2022-11-22 RX ADMIN — ACETAMINOPHEN 975 MG: 325 TABLET ORAL at 12:57

## 2022-11-22 RX ADMIN — Medication 3 G: at 08:02

## 2022-11-22 RX ADMIN — PROPOFOL 200 MG: 10 INJECTION, EMULSION INTRAVENOUS at 07:41

## 2022-11-22 RX ADMIN — Medication 80 MCG: at 08:44

## 2022-11-22 RX ADMIN — FENTANYL CITRATE 50 MCG: 50 INJECTION, SOLUTION INTRAMUSCULAR; INTRAVENOUS at 10:47

## 2022-11-22 RX ADMIN — KETAMINE HYDROCHLORIDE 20 MG: 10 INJECTION INTRAMUSCULAR; INTRAVENOUS at 09:41

## 2022-11-22 RX ADMIN — Medication 80 MCG: at 09:14

## 2022-11-22 RX ADMIN — MIDAZOLAM HYDROCHLORIDE 2 MG: 1 INJECTION, SOLUTION INTRAMUSCULAR; INTRAVENOUS at 07:30

## 2022-11-22 RX ADMIN — Medication 80 MCG: at 08:17

## 2022-11-22 RX ADMIN — Medication 40 MCG: at 09:55

## 2022-11-22 RX ADMIN — Medication 80 MCG: at 08:07

## 2022-11-22 RX ADMIN — NEOSTIGMINE METHYLSULFATE 4 MG: 1 INJECTION, SOLUTION INTRAVENOUS at 10:42

## 2022-11-22 RX ADMIN — Medication 10 MG: at 08:23

## 2022-11-22 RX ADMIN — ROCURONIUM BROMIDE 10 MG: 10 INJECTION INTRAVENOUS at 08:54

## 2022-11-22 RX ADMIN — Medication 10 MG: at 08:13

## 2022-11-22 RX ADMIN — KETAMINE HYDROCHLORIDE 20 MG: 10 INJECTION INTRAMUSCULAR; INTRAVENOUS at 10:47

## 2022-11-22 RX ADMIN — Medication 80 MCG: at 08:13

## 2022-11-22 RX ADMIN — ROCURONIUM BROMIDE 10 MG: 10 INJECTION INTRAVENOUS at 08:15

## 2022-11-22 RX ADMIN — PROPOFOL 25 MG: 10 INJECTION, EMULSION INTRAVENOUS at 10:56

## 2022-11-22 RX ADMIN — Medication 5 MG: at 08:09

## 2022-11-22 RX ADMIN — LIDOCAINE HYDROCHLORIDE 50 MG: 20 INJECTION, SOLUTION EPIDURAL; INFILTRATION; INTRACAUDAL; PERINEURAL at 10:54

## 2022-11-22 RX ADMIN — ROCURONIUM BROMIDE 10 MG: 10 INJECTION INTRAVENOUS at 09:05

## 2022-11-22 RX ADMIN — KETAMINE HYDROCHLORIDE 30 MG: 10 INJECTION INTRAMUSCULAR; INTRAVENOUS at 08:15

## 2022-11-22 RX ADMIN — ROCURONIUM BROMIDE 10 MG: 10 INJECTION INTRAVENOUS at 07:41

## 2022-11-22 RX ADMIN — PROPOFOL 50 MG: 10 INJECTION, EMULSION INTRAVENOUS at 10:39

## 2022-11-22 RX ADMIN — FAMOTIDINE 20 MG: 10 INJECTION INTRAVENOUS at 07:30

## 2022-11-22 RX ADMIN — FENTANYL CITRATE 50 MCG: 50 INJECTION, SOLUTION INTRAMUSCULAR; INTRAVENOUS at 07:36

## 2022-11-22 RX ADMIN — Medication 5 MG: at 08:07

## 2022-11-22 RX ADMIN — FENTANYL CITRATE 50 MCG: 50 INJECTION, SOLUTION INTRAMUSCULAR; INTRAVENOUS at 08:15

## 2022-11-22 RX ADMIN — METRONIDAZOLE 500 MG: 500 INJECTION, SOLUTION INTRAVENOUS at 08:00

## 2022-11-22 RX ADMIN — KETOROLAC TROMETHAMINE 30 MG: 30 INJECTION, SOLUTION INTRAMUSCULAR; INTRAVENOUS at 12:57

## 2022-11-22 RX ADMIN — FENTANYL CITRATE 50 MCG: 50 INJECTION, SOLUTION INTRAMUSCULAR; INTRAVENOUS at 07:41

## 2022-11-22 RX ADMIN — GLYCOPYRROLATE 0.2 MG: 0.2 INJECTION INTRAMUSCULAR; INTRAVENOUS at 08:20

## 2022-11-22 RX ADMIN — SUCCINYLCHOLINE CHLORIDE 150 MG: 20 INJECTION, SOLUTION INTRAMUSCULAR; INTRAVENOUS at 07:41

## 2022-11-22 NOTE — PROGRESS NOTES
11/22/22  4:16 PM    Care Management Initial Evaluation:    Reason for Admission:    1. S/P laparoscopic hysterectomy                      RUR Score: 2%                    Plan for utilizing home health:     No home health history     PCP: First and Last name:  Other, Emily Del Cid a new PCP at [facility], has not been to see since being assigned a new PCP     Name of Practice:    Are you a current patient: Yes/No: Yes   Approximate date of last visit:    Can you participate in a virtual visit with your PCP:                     Current Advanced Directive/Advance Care Plan: Full Code      Healthcare Decision Maker:   Click here to complete [address] including selection of the Healthcare Decision Maker Relationship (ie \"Primary\")  Nelson Bailey                         Transition of Care Plan:                    Patient resides with her spouse in a two story home with 3 entry steps. She is normally independent with all ADL's, drives. She uses [facility] in [place] for prescription needs. 1). Patient admitted for surgery  2). Family will transport at dc  3). CM following for dc needs    [facility] Management Interventions  PCP Verified by CM: Yes  Mode of Transport at Discharge:  Other (see comment) (Spouse will transport at [facility])  Transition of Care Consult (CM Consult): Discharge Planning  Discharge Durable Medical Equipment: No  Physical Therapy Consult: No  Occupational Therapy Consult: No  Speech Therapy Consult: No  Support Systems: Spouse/Significant Other  Confirm Follow Up Transport: Family  Discharge Location  Patient Expects to be Discharged to[de-identified] Home with family assistance

## 2022-11-22 NOTE — PROGRESS NOTES
[facility] Lovenox Dosing 11/22/22  Lovenox dose change per protocol  Physician: Dr Abe Chapman    Protocol  Enoxaparin prophylaxis dosing (medically ill, surgical patients)   Patient Weight (kg)     50 and below 51 - 100 101 - 150 151 - 174 175 or greater         Estimated CrCl  (ml/min) 30 or greater   30 mg SUBQ daily   40 mg SUBQ daily 30 mg SUBQ BID  40 mg SUBQ BID 60mg SUBQ BID      15-29 UFH 5000 units SUBQ BID   30 mg SUBQ daily 30 mg SUBQ daily 40 mg SUBQ daily   60 mg SUBQ daily      Less than 15 or Dialysis UFH 5000 units SUBQ BID   UFH 5000 units SUBQ TID UFH 7500 units SUBQ TID     Estimated Creatinine Clearance: 151.1 mL/min (by C-G formula based on SCr of 0.75 mg/dL).   Current dose: Lovenox 40 mg SC daily  Recommendation: Lovenox 40 mg SC q12hr for BMI 57    Thank you  Carlos Manuel Saldivar, PharmD  [phone]

## 2022-11-22 NOTE — BRIEF OP NOTE
Brief Postoperative Note    Patient: Ruiz Robison  YOB: 1980  MRN: [id number]    Date of Procedure: 11/22/2022     Pre-Op Diagnosis: MENORRHAGIA, large ovarian cyst    Post-Op Diagnosis: Same as preoperative diagnosis. Procedure(s):  ROBOTIC ASSISTED TOTAL LAPAROSCOPIC HYSTERECTOMY, BILATERAL SALPINGECTOMY, LEFT OOPHORECTOMY, REMOVAL OF RIGHT PERITUBAL CYST, LYSIS OF ADHESIONS, cystoscopy. Uterus >250g    Surgeon(s):  MD Jose Lubin MD    Surgical Assistant: Surg Asst-1: Petra Stewart    Anesthesia: General     Estimated Blood Loss (mL): 12SY    Complications: None    Specimens:   ID Type Source Tests Collected by Time Destination   1 : UTERUS, BILATERAL FALLOPIAN TUBES, LEFT OVARY, LEFT OVARIAN CYST, RIGHT PERITUBAL CYST Preservative Uterus  Evin Webster MD 11/22/2022 [address]        Implants: * No implants in log *    Drains: * No LDAs found *    Findings: Bimanual with globular mobile uterus. Cervix nulliparous and normal appearing. Dx laparoscopy with enlarged globular uterus with adhesions on anterior lower uterine segment. Right ovary and fallopian tube normal appearing with approximately 5cm right paratubal cyst.  Left ovary with approximately 8 cm cyst. Left ovary enlarged and elongated along the ovary. Cystoscopy with brisk efflux from bilateral orifices, no sutures or defects in the bladder mucosa.     Electronically Signed by Berenice Loo MD on 11/22/2022 at 11:06 AM

## 2022-11-22 NOTE — DISCHARGE INSTRUCTIONS
Please take tylenol scheduled (1000mg every 6 hours). Do not exceed 4000mg per day. Please take ibuprofen scheduled (800mg every 8 hours). You can stop taking them scheduled after the first two days and then take as needed. Please take oxycodone as needed for pain. Please take docusate/senna for stool softener. If no bowel movement by day 3-4, can take miralax or lactulose.

## 2022-11-22 NOTE — ANESTHESIA PREPROCEDURE EVALUATION
Relevant Problems   No relevant active problems       Anesthetic History   No history of anesthetic complications            Review of Systems / Medical History  Patient summary reviewed, nursing notes reviewed and pertinent labs reviewed    Pulmonary  Within defined limits                 Neuro/Psych   Within defined limits           Cardiovascular  Within defined limits  Hypertension                   GI/Hepatic/Renal  Within defined limits              Endo/Other  Within defined limits           Other Findings              Physical Exam    Airway  Mallampati: II  TM Distance: 4 - 6 cm  Neck ROM: normal range of motion   Mouth opening: Normal     Cardiovascular    Rhythm: regular  Rate: normal         Dental  No notable dental hx       Pulmonary  Breath sounds clear to auscultation               Abdominal         Other Findings            Anesthetic Plan    ASA: 2  Anesthesia type: general            Anesthetic plan and risks discussed with: Patient

## 2022-11-22 NOTE — OP NOTES
Operative Note    Patient: Mickey Velázquez  YOB: 1980  MRN: [id number]    Date of Procedure: 11/22/2022     Pre-Op Diagnosis: MENORRHAGIA, pelvic mas    Post-Op Diagnosis: Menorrhagia, right paratubal cyst, left large ovarian cyst    Procedure(s):  ROBOTIC ASSISTED TOTAL LAPAROSCOPIC HYSTERECTOMY, BILATERAL SALPINGECTOMY, LEFT OOPHORECTOMY, REMOVAL OF RIGHT PERITUBAL CYST, LYSIS OF ADHESIONS > 30 minutes, cystoscopy. Surgeon(s):  MD Kerrie Molina MD    Surgical Assistant: Surg Asst-1: Leeann Leventhal    Anesthesia: General     Estimated Blood Loss (mL):  74DK    Complications: None    Specimens:   ID Type Source Tests Collected by Time Destination   1 : UTERUS, BILATERAL FALLOPIAN TUBES, LEFT OVARY, LEFT OVARIAN CYST, RIGHT PERITUBAL CYST Preservative Uterus  Dale Keller MD 11/22/2022 [address]        Implants: * No implants in log *    Drains: * No LDAs found *    Antibiotics: 3g ancef, flagyl 500mg     Findings: Bimanual with globular mobile uterus. Cervix nulliparous and normal appearing. Dx laparoscopy with enlarged heavy and globular uterus with adhesions on anterior lower uterine segment. Anterior lower uterine segment adhesiolysis >30 minutes. Right ovary and fallopian tube normal appearing with approximately 5cm right paratubal cyst.  Left ovary with approximately 8 cm cyst. Left ovary enlarged and elongated along the ovary. Cystoscopy with brisk efflux from bilateral orifices, no sutures or defects in the bladder mucosa. There was marked difficulty with this case given the body habitus of the patient, allowing only for 11mmHg of insufflation and 21 degrees of trendelenburg for ventillation purposes, which was suboptimal for visualization. Detailed Description of Procedure:     Procedure in Detail:   The patient was taken to the operating room and placed on the OR table and given general anesthesia.  She was then placed in lithotomy position and prepped and draped in a sterile fashion. Then, a time-out was performed. Urinary limon catheter was then placed. A speculum was placed in the vagina. A single-tooth tenaculum was used to grasp the anterior lip of the cervix and the uterus was sounded to 8 cm. A medium VCare uterine manipulator was then placed and the balloon was inflated. Attention was then turned to the abdomen. 0.25% marcaine was injected at the umbilicus and a 8mm incision was made vertically. Then a Veress was used to obtain abdominal entry and insufflation was performed. Opening pressure was <10mmHg. A 8-mm robotic port was placed using a Visiport confirming abdominal entry without evidence of injury to surrounding tissue. Abdominal survey was performed with the findings noted above. Right lateral and left lateral robotic ports were then placed under direct visualization after infiltration of anesthetic. A 5mm airseal was placed in right lateral abdomen under direct visualization. An additional 8mm robotic port was placed in the left lateral abdomen. Next the Cullen Lean was then docked and instruments were placed under direct visualization. The monopolar scissors and bipolar graspers were used to serially dessicate, ligate and cut the right fallopian tube as well as the right paratubal cyst.  This was then taken out of the assitant port. Next the right round ligament was dessicated ligated and cut. The anterior leaf of the broad was carefully dissected. The anterior leaf was taken down in serial layers due to adhesions of the bladder and peritoneium to the uterus. Serial dissection in layers with the bipolar graspers and monopolar scissors was used until the colpotomy cup was visualized. The uteroovarian was then dessicated and cut, allowing the ovary to fall away to the pelvic brim. The posterior leaf was then taken down.  he right uterine artery was then skeletonized and dessicated and cut in a serial fashion to lateralize to the outside of the colpotomy cup. Next attention was turned to the left side. The left ovary was lifted and the IP was skeletonized by allowing the peritonium to fall away with monopolar scissor cautery. As the ovary and cyst were suspended, the IP was serially dessicated ligated and cut. Hemostasis was excellent. The anterior leaf was taken down, and additional lysis of adhesions was performed to develop the bladder flap and ensure no injury to the bladder. The posterior leaf was developed. Then the uterine vessels were skeletonized and coagulated in the same fashion as the right side. Next a combination of cut and coag with monopolar scissors was used to make colpotomy with complete transection of the cervix from the vagina. Given the size of the uterus, a 15cm susanna bag was placed vaginally and deployed. The uterus was placed inside. The bag was retracted and brought out the vagina. Attention was then turned to the vagina. The bag edges were brought outside the vagina. Vaginal morcellation with 11 blade was performed for approximately 20 minutes with delivery of the specimen. Next a 10cm endocatch bag was placed in the vagina and the left fallopian tube, ovary and ovarian cyst were placed within and then removed. Vaginal packing was then placed. Attention was then turned back to the abdomen. Two 2-0 v-lock sutures were used to close the cuff in a running fashion, both starting and either apex incorporating the uterosacral ligament. Irrigation performed with excellent hemostasis. Pressure was decreased to 6mmHg with excellent hemostasis. Surgicel powder applied to the vaginal cuff. Survey of the abdomen again revealed no injury to bowel or other tissue. The robot was then undocked. All the air was released from the abdomen. Next cystoscopy was performed with brisk efflux bilaterally, no evidence of bladder mucosal disruption.       All trochars were removed from the abdomen. The laparoscopic sites were closed with 4-0 monocryl. Steri strips and band aids applied. Vagina inspected and no lacerations. Vaginal cuff was palpated and intact. Count was correct. Her legs were then taken out of lithotomy position and she tolerated the procedure well. She was awoken from anesthesia and taken to the PACU in stable  condition.      Ginny Parker MD     Electronically Signed by Ginny Parker MD on 11/22/2022 at 11:09 AM

## 2022-11-22 NOTE — ANESTHESIA POSTPROCEDURE EVALUATION
Procedure(s):  ROBOTIC ASSISTED TOTAL LAPAROSCOPIC HYSTERECTOMY, BILATERAL SALPINGECTOMY, LEFT OOPHORECTOMY, REMOVAL OF RIGHT PERITUBAL CYST, LYSIS OF ADHESIONS.     general    Anesthesia Post Evaluation        Patient location during evaluation: bedside  Level of consciousness: awake  Pain management: satisfactory to patient  Airway patency: patent  Anesthetic complications: no  Cardiovascular status: acceptable  Respiratory status: acceptable  Hydration status: acceptable        INITIAL Post-op Vital signs:   Vitals Value Taken Time   /67 11/22/22 1115   Temp 37.1 °C (98.8 °F) 11/22/22 1115   Pulse 72 11/22/22 1115   Resp 15 11/22/22 1115   SpO2 95 % 11/22/22 1115

## 2022-11-22 NOTE — PROGRESS NOTES
Received a page from pt's RN, sUman Kaplan. Patient in pre-op requesting a blessing before surgery. Pt shared her medical history and the decisions that were made to have the surgery today. Pt shared she jodie through her [address], and the support of her family. Pt finds colleen in her 7 y/o daughter and is looking forward to being well again to enjoy being with her daughter as she grows up. Assessed coping Listened attentively. Affirmed pt's juan. Affirmed pt's hope. Reviewed historical coping. Reflected value statements. Advised of  availability. Please contact [facility] for further referrals.     [address], [address], [facility]  Staff   Paging service: [phone] (VALENTINO)

## 2022-11-22 NOTE — INTERVAL H&P NOTE
Update History & Physical    The Patient's History and Physical of November 7, 2022 was reviewed with the patient and I examined the patient. There was no change. The surgical site was confirmed by the patient and me. Plan:  The risk, benefits, expected outcome, and alternative to the recommended procedure have been discussed with the patient. Patient understands and wants to proceed with the procedure.     Electronically signed by Romain Tang MD on 11/22/2022 at 7:15 AM

## 2022-11-23 VITALS
OXYGEN SATURATION: 93 % | BODY MASS INDEX: 47.09 KG/M2 | RESPIRATION RATE: 14 BRPM | TEMPERATURE: 98.1 F | HEART RATE: 80 BPM | DIASTOLIC BLOOD PRESSURE: 63 MMHG | WEIGHT: 293 LBS | SYSTOLIC BLOOD PRESSURE: 132 MMHG | HEIGHT: 66 IN

## 2022-11-23 PROCEDURE — 74011000250 HC RX REV CODE- 250: Performed by: OBSTETRICS & GYNECOLOGY

## 2022-11-23 PROCEDURE — G0378 HOSPITAL OBSERVATION PER HR: HCPCS

## 2022-11-23 PROCEDURE — 74011250636 HC RX REV CODE- 250/636: Performed by: OBSTETRICS & GYNECOLOGY

## 2022-11-23 PROCEDURE — 74011250637 HC RX REV CODE- 250/637: Performed by: OBSTETRICS & GYNECOLOGY

## 2022-11-23 RX ORDER — SIMETHICONE 80 MG
80 TABLET,CHEWABLE ORAL
Status: DISCONTINUED | OUTPATIENT
Start: 2022-11-23 | End: 2022-11-23 | Stop reason: HOSPADM

## 2022-11-23 RX ADMIN — ACETAMINOPHEN 975 MG: 325 TABLET ORAL at 07:19

## 2022-11-23 RX ADMIN — KETOROLAC TROMETHAMINE 30 MG: 30 INJECTION, SOLUTION INTRAMUSCULAR; INTRAVENOUS at 07:19

## 2022-11-23 RX ADMIN — ACETAMINOPHEN 975 MG: 325 TABLET ORAL at 01:12

## 2022-11-23 RX ADMIN — SODIUM CHLORIDE 125 ML/HR: 9 INJECTION, SOLUTION INTRAVENOUS at 07:19

## 2022-11-23 RX ADMIN — ENOXAPARIN SODIUM 40 MG: 100 INJECTION SUBCUTANEOUS at 08:39

## 2022-11-23 RX ADMIN — Medication 10 ML: at 07:19

## 2022-11-23 RX ADMIN — ESCITALOPRAM OXALATE 10 MG: 10 TABLET ORAL at 08:39

## 2022-11-23 RX ADMIN — KETOROLAC TROMETHAMINE 30 MG: 30 INJECTION, SOLUTION INTRAMUSCULAR; INTRAVENOUS at 01:12

## 2022-11-23 NOTE — PROGRESS NOTES
11/23/22      State Observation Letter was verbally explained to patient and provided in writing to patient. The patient signed the document.

## 2022-11-23 NOTE — PROGRESS NOTES
Gynecology Progress Note    Raven Gilmore    Assesment: 37yo POD1 s/p TLH/BS/LO, right paratubal cyst removal Davinci assisted, cystoscopy. Doing well. - OOB, ambulate  - VSS, spontaneously voiding  - Await flatus prior to discharge  - Tolerating PO without N/V, pain well controlled  - Lovenox for VTE ppx  - Follow up in 2 weeks with Dr. Declan Khan: Home after flatus    She is without significant complaints. Pain controlled on current medication. Voiding without difficulty. Patient is not passing flatus. She is is tolerating her diet. Orders/Charges: Medium    Vitals:  Visit Vitals  /70 (BP 1 Location: Right lower arm, BP Patient Position: At rest)   Pulse 72   Temp 98.4 °F (36.9 °C)   Resp 14   Ht 5' 6\" (1.676 m)   Wt (!) 161 kg (355 lb)   LMP  (LMP Unknown) Comment: Dysfunctional uterine bleeding   SpO2 95%   BMI 57.30 kg/m²     Temp (24hrs), Av.4 °F (36.9 °C), Min:97.8 °F (36.6 °C), Max:99.3 °F (37.4 °C)      Last 24hr Input/Output:    Intake/Output Summary (Last 24 hours) at 2022 9380  Last data filed at 2022 2000  Gross per 24 hour   Intake 3095 ml   Output 900 ml   Net 2195 ml          Exam:  General: alert, cooperative, no distress, appears stated age     Lung: clear to auscultation bilaterally     Heart: regular rate and rhythm     Abdomen: abdomen is soft without significant tenderness or guarding. Incisions c/d/I. Mildly tympanic.  +BS     Extremities: extremities normal, atraumatic, no cyanosis or edema      Labs:   Lab Results   Component Value Date/Time    WBC 7.9 2022 10:14 AM    HGB 12.9 2022 10:14 AM    HCT 42.0 2022 10:14 AM    PLATELET 986  10:14 AM       Recent Results (from the past 24 hour(s))   TYPE & SCREEN    Collection Time: 22  6:41 AM   Result Value Ref Range    Crossmatch Expiration 2022,2359     ABO/Rh(D) O POSITIVE     Antibody screen POS     Antibody ID NO ADDITIONAL ANTIBODIES DETECTED     Comment       previously identified Anti C and Nonspecific Antibody    ANTIGEN TYPING LYNNETTE NEGATIVE,     LAYNE Poly POS     LAYNE IgG NEG     LAYNE C3b/C3d NEG     Unit number [id number]     Blood component type [facility]     Unit division 00     Status of unit ALLOCATED     ANTIGEN/ANTIBODY INFO C NEGATIVE,     Crossmatch result Compatible     Unit number [id number]     Blood component type [facility]     Unit division 00     Status of unit ALLOCATED     ANTIGEN/ANTIBODY INFO C NEGATIVE,     Crossmatch result Compatible    HCG URINE, QL. - POC    Collection Time: 11/22/22  6:46 AM   Result Value Ref Range    Pregnancy test,urine (POC) Negative NEG

## 2022-11-23 NOTE — PROGRESS NOTES
Bedside and Verbal shift change report given to Paige Still RN (oncoming nurse) by Jose Agustin RN (offgoing nurse). Report included the following information SBAR, Kardex, Intake/Output, MAR, Accordion, Recent Results, and Med Rec Status.

## 2022-11-23 NOTE — PROGRESS NOTES
Discharge instructions/AVS discussed in detail with pt. Pt verbalizes understanding of all instructions, including where to get new medications. Pt denies any questions about instructions and has signed AVS which was placed in pt's paper chart. Pt discharged per MD order, being driven home by her . Pt was transferred to car via wheelchair, assisted by volunteer staff. Pt in no apparent distress at time of discharge and with no complaints.

## 2022-11-23 NOTE — PROGRESS NOTES
Ambulating to bathroom with x1 assistance. Using call light as needed for assistance.  Voiding without difficulty post limon removal.

## 2022-11-25 LAB
ABO + RH BLD: NORMAL
ANTI-COMPLEMENT (C3B,C3D): NORMAL
ANTIGENS PRESENT BLD: NORMAL
ANTIGENS PRESENT RBC DONR: NORMAL
ANTIGENS PRESENT RBC DONR: NORMAL
BLD PROD TYP BPU: NORMAL
BLD PROD TYP BPU: NORMAL
BLOOD BANK CMNT PATIENT-IMP: NORMAL
BLOOD GROUP ANTIBODIES SERPL: NORMAL
BLOOD GROUP ANTIBODIES SERPL: NORMAL
BPU ID: NORMAL
BPU ID: NORMAL
CROSSMATCH RESULT,%XM: NORMAL
CROSSMATCH RESULT,%XM: NORMAL
DAT IGG-SP REAG RBC QL: NORMAL
DAT POLY-SP REAG RBC QL: NORMAL
SPECIMEN EXP DATE BLD: NORMAL
STATUS OF UNIT,%ST: NORMAL
STATUS OF UNIT,%ST: NORMAL
UNIT DIVISION, %UDIV: 0
UNIT DIVISION, %UDIV: 0

## (undated) DEVICE — POWDER HEMOSTAT GEL 3.0GR -- SURGICEL

## (undated) DEVICE — ROBOTIC GYN-SFMC: Brand: MEDLINE INDUSTRIES, INC.

## (undated) DEVICE — SUTURE VCRL SZ 3-0 L27IN ABSRB UD L26MM SH 1/2 CIR J416H

## (undated) DEVICE — MASTISOL ADHESIVE LIQ 2/3ML

## (undated) DEVICE — STRIP,CLOSURE,WOUND,MEDI-STRIP,1/2X4: Brand: MEDLINE

## (undated) DEVICE — TRI-LUMEN FILTERED TUBE SET WITH ACTIVATED CHARCOAL FILTER: Brand: AIRSEAL

## (undated) DEVICE — GLOVE SURG SZ 7 L12IN FNGR THK79MIL GRN LTX FREE

## (undated) DEVICE — TISSUE RETRIEVAL SYSTEM: Brand: INZII RETRIEVAL SYSTEM

## (undated) DEVICE — CYSTO/BLADDER IRRIGATION SET, REGULATING CLAMP

## (undated) DEVICE — KIT INCLUDES:GTB14, ALEXIS CONTAINED EXTRACTION SYSTEMC0R47, 12X100 KII BALLOON BLUNT TIP TROCAR: Brand: ALEXIS CONTAINED EXTRACTION SYSTEM WITH KII BALLOON BLUNT TIP SYSTEM

## (undated) DEVICE — SURGICEL ENDOSCP APPL

## (undated) DEVICE — ARM DRAPE

## (undated) DEVICE — Device

## (undated) DEVICE — BNDG ADH FABRIC 2X4IN ST LF --

## (undated) DEVICE — OBTRTR BLDELSS OPT 8MM DISP -- DA VINICI XI - SNGL USE

## (undated) DEVICE — SUT MCRYL 4-0 27IN PS2 UD --

## (undated) DEVICE — SUTURE VLOC 90 2/0 VL 6 GS-22 VLOCM2105

## (undated) DEVICE — CLICKLINE SCISSORS INSERT: Brand: CLICKLINE

## (undated) DEVICE — SOLUTION IRRIG 1000ML 0.9% SOD CHL USP POUR PLAS BTL

## (undated) DEVICE — INTENDED FOR TISSUE SEPARATION, AND OTHER PROCEDURES THAT REQUIRE A SHARP SURGICAL BLADE TO PUNCTURE OR CUT.: Brand: BARD-PARKER ® CARBON RIB-BACK BLADES

## (undated) DEVICE — COVER MPLR TIP CRV SCIS ACC DA VINCI

## (undated) DEVICE — INSUFFLATION NEEDLE: Brand: SURGINEEDLE

## (undated) DEVICE — ELECTRO LUBE IS A SINGLE PATIENT USE DEVICE THAT IS INTENDED TO BE USED ON ELECTROSURGICAL ELECTRODES TO REDUCE STICKING.: Brand: KEY SURGICAL ELECTRO LUBE

## (undated) DEVICE — COVER LT HNDL PLAS RIG 1 PER PK

## (undated) DEVICE — TAPE,CLOTH/SILK,CURAD,3"X10YD,LF,40/CS: Brand: CURAD

## (undated) DEVICE — AIRSEAL 5 MM ACCESS PORT AND LOW PROFILE OBTURATOR WITH BLADELESS OPTICAL TIP, 120 MM LENGTH: Brand: AIRSEAL

## (undated) DEVICE — WOUND RETRACTOR AND PROTECTOR: Brand: ALEXIS WOUND PROTECTOR-RETRACTOR

## (undated) DEVICE — SEAL UNIV 5-8MM DISP BX/10 -- DA VINCI XI - SNGL USE

## (undated) DEVICE — VCARE MEDIUM, UTERINE MANIPULATOR, VAGINAL-CERVICAL-AHLUWALIA'S-RETRACTOR-ELEVATOR: Brand: VCARE

## (undated) DEVICE — GLOVE ORANGE PI 7   MSG9070

## (undated) DEVICE — PAD POSITIONING WNG STD KIT W/BODY STRP LF DISP